# Patient Record
Sex: FEMALE | Race: ASIAN | NOT HISPANIC OR LATINO | Employment: FULL TIME
[De-identification: names, ages, dates, MRNs, and addresses within clinical notes are randomized per-mention and may not be internally consistent; named-entity substitution may affect disease eponyms.]

---

## 2024-05-19 ENCOUNTER — HEALTH MAINTENANCE LETTER (OUTPATIENT)
Age: 34
End: 2024-05-19

## 2024-06-03 ENCOUNTER — OFFICE VISIT (OUTPATIENT)
Dept: FAMILY MEDICINE | Facility: CLINIC | Age: 34
End: 2024-06-03
Payer: COMMERCIAL

## 2024-06-03 VITALS
TEMPERATURE: 97.8 F | WEIGHT: 163 LBS | DIASTOLIC BLOOD PRESSURE: 60 MMHG | BODY MASS INDEX: 27.83 KG/M2 | RESPIRATION RATE: 16 BRPM | HEIGHT: 64 IN | HEART RATE: 89 BPM | SYSTOLIC BLOOD PRESSURE: 107 MMHG | OXYGEN SATURATION: 98 %

## 2024-06-03 DIAGNOSIS — R22.9 LUMP OF SKIN: Primary | ICD-10-CM

## 2024-06-03 DIAGNOSIS — Z86.39 HISTORY OF IRON DEFICIENCY: ICD-10-CM

## 2024-06-03 DIAGNOSIS — Z30.09 FAMILY PLANNING: ICD-10-CM

## 2024-06-03 LAB
ERYTHROCYTE [DISTWIDTH] IN BLOOD BY AUTOMATED COUNT: 12.9 % (ref 10–15)
HCT VFR BLD AUTO: 41.5 % (ref 35–47)
HGB BLD-MCNC: 13.5 G/DL (ref 11.7–15.7)
HOLD SPECIMEN: NORMAL
MCH RBC QN AUTO: 25.5 PG (ref 26.5–33)
MCHC RBC AUTO-ENTMCNC: 32.5 G/DL (ref 31.5–36.5)
MCV RBC AUTO: 78 FL (ref 78–100)
PLATELET # BLD AUTO: 230 10E3/UL (ref 150–450)
RBC # BLD AUTO: 5.3 10E6/UL (ref 3.8–5.2)
WBC # BLD AUTO: 8.6 10E3/UL (ref 4–11)

## 2024-06-03 PROCEDURE — G2211 COMPLEX E/M VISIT ADD ON: HCPCS

## 2024-06-03 PROCEDURE — 84443 ASSAY THYROID STIM HORMONE: CPT

## 2024-06-03 PROCEDURE — 36415 COLL VENOUS BLD VENIPUNCTURE: CPT

## 2024-06-03 PROCEDURE — 85027 COMPLETE CBC AUTOMATED: CPT

## 2024-06-03 PROCEDURE — 99203 OFFICE O/P NEW LOW 30 MIN: CPT

## 2024-06-03 ASSESSMENT — PAIN SCALES - GENERAL: PAINLEVEL: NO PAIN (0)

## 2024-06-03 NOTE — PROGRESS NOTES
Assessment & Plan     Lump of skin  Right posterior thigh small soft mobile mass, appears to be a lipoma. Right forehead small slightly firm mobile mass that she reports changes in size but never disappears. Would like to discuss removal. Derm referral placed today.   - Adult Dermatology  Referral    History of iron deficiency  Reports low hemoglobin during pregnancy and post partum. Not currently on any iron supplements. Will check labs today and LISETTE signed to get her records from [place].   - CBC with Platelets and Reflex to Iron Studies  - TSH with free T4 reflex    Family planning  Has 8 month old at home and wants to start planning for second child. Would like to establish with obgyn/CNM.  - Ob/Gyn  Referral    Plan to follow up for routine physical.     Subjective   Umu is a 33 year old, presenting for the following health issues:  office vist, Derm Problem, and Recheck Medication (Pt reports that she is here to discuss having a small bump on rt side of forehead, rt leg above knee.)      6/3/2024    11:18 AM   Additional Questions   Roomed by marcial   Accompanied by          6/3/2024    11:18 AM   Patient Reported Additional Medications   Patient reports taking the following new medications none     History of Present Illness       Reason for visit:  Derm concern    She eats 2-3 servings of fruits and vegetables daily.She consumes 0 sweetened beverage(s) daily.She exercises with enough effort to increase her heart rate 9 or less minutes per day.  She exercises with enough effort to increase her heart rate 3 or less days per week.   She is taking medications regularly.       Son born August 2023   2 weeks after delivery noticed a lump on her forehead and right posterior thigh. No change in thigh but the lump on her forehead sometimes will change in size but never fully go away. Sometimes will feel more firm. No redness or pain. Denies any drainage.    Accompanied by her   "today and they recently moved from [place] originally from [place]. Would like to establish with obgyn to discuss family planning for their next child.       Review of Systems  Constitutional, HEENT, cardiovascular, pulmonary, gi and gu systems are negative, except as otherwise noted.      Objective    /60 (BP Location: Left arm, Patient Position: Sitting, Cuff Size: Adult Regular)   Pulse 89   Temp 97.8  F (36.6  C) (Tympanic)   Resp 16   Ht 1.626 m (5' 4\")   Wt 73.9 kg (163 lb)   LMP 05/26/2024 (Approximate)   SpO2 98%   Breastfeeding No   BMI 27.98 kg/m    Body mass index is 27.98 kg/m .    Physical Exam   GENERAL: alert and no distress  NECK: no adenopathy, no asymmetry, masses, or scars  SKIN: soft small mobile mass right posterior thigh, no erythema or edema.  Right forehead with small slightly firm mobile mass. No erythema or edema. No drainage.   PSYCH: mentation appears normal, affect normal/bright          Signed Electronically by: ALEXANDRA Perez CNP    "

## 2024-06-04 LAB — TSH SERPL DL<=0.005 MIU/L-ACNC: 0.85 UIU/ML (ref 0.3–4.2)

## 2024-10-09 ENCOUNTER — APPOINTMENT (OUTPATIENT)
Dept: LAB | Facility: CLINIC | Age: 34
End: 2024-10-09
Attending: ADVANCED PRACTICE MIDWIFE
Payer: COMMERCIAL

## 2024-10-09 ENCOUNTER — OFFICE VISIT (OUTPATIENT)
Dept: OBGYN | Facility: CLINIC | Age: 34
End: 2024-10-09
Attending: ADVANCED PRACTICE MIDWIFE
Payer: COMMERCIAL

## 2024-10-09 VITALS
DIASTOLIC BLOOD PRESSURE: 81 MMHG | SYSTOLIC BLOOD PRESSURE: 138 MMHG | HEART RATE: 98 BPM | BODY MASS INDEX: 26.95 KG/M2 | WEIGHT: 157 LBS

## 2024-10-09 DIAGNOSIS — N92.1 MENOMETRORRHAGIA: ICD-10-CM

## 2024-10-09 DIAGNOSIS — N39.3 STRESS INCONTINENCE OF URINE: ICD-10-CM

## 2024-10-09 DIAGNOSIS — N93.9 ABNORMAL UTERINE BLEEDING: Primary | ICD-10-CM

## 2024-10-09 LAB
ERYTHROCYTE [DISTWIDTH] IN BLOOD BY AUTOMATED COUNT: 13.2 % (ref 10–15)
HCG INTACT+B SERPL-ACNC: <1 MIU/ML
HCG UR QL: NEGATIVE
HCG UR QL: NORMAL
HCT VFR BLD AUTO: 41.6 % (ref 35–47)
HGB BLD-MCNC: 14 G/DL (ref 11.7–15.7)
INTERNAL QC OK POCT: NORMAL
INTERNAL QC OK POCT: NORMAL
MCH RBC QN AUTO: 26.6 PG (ref 26.5–33)
MCHC RBC AUTO-ENTMCNC: 33.7 G/DL (ref 31.5–36.5)
MCV RBC AUTO: 79 FL (ref 78–100)
PLATELET # BLD AUTO: 265 10E3/UL (ref 150–450)
POCT KIT EXPIRATION DATE: NORMAL
POCT KIT EXPIRATION DATE: NORMAL
POCT KIT LOT NUMBER: NORMAL
POCT KIT LOT NUMBER: NORMAL
RBC # BLD AUTO: 5.27 10E6/UL (ref 3.8–5.2)
TSH SERPL DL<=0.005 MIU/L-ACNC: 0.89 UIU/ML (ref 0.3–4.2)
WBC # BLD AUTO: 9.1 10E3/UL (ref 4–11)

## 2024-10-09 PROCEDURE — 81025 URINE PREGNANCY TEST: CPT | Performed by: ADVANCED PRACTICE MIDWIFE

## 2024-10-09 PROCEDURE — 99203 OFFICE O/P NEW LOW 30 MIN: CPT | Performed by: ADVANCED PRACTICE MIDWIFE

## 2024-10-09 PROCEDURE — 36415 COLL VENOUS BLD VENIPUNCTURE: CPT | Performed by: ADVANCED PRACTICE MIDWIFE

## 2024-10-09 PROCEDURE — 84146 ASSAY OF PROLACTIN: CPT | Performed by: ADVANCED PRACTICE MIDWIFE

## 2024-10-09 PROCEDURE — 99213 OFFICE O/P EST LOW 20 MIN: CPT | Performed by: ADVANCED PRACTICE MIDWIFE

## 2024-10-09 PROCEDURE — 85014 HEMATOCRIT: CPT | Performed by: ADVANCED PRACTICE MIDWIFE

## 2024-10-09 PROCEDURE — 84443 ASSAY THYROID STIM HORMONE: CPT | Performed by: ADVANCED PRACTICE MIDWIFE

## 2024-10-09 PROCEDURE — 84702 CHORIONIC GONADOTROPIN TEST: CPT | Performed by: ADVANCED PRACTICE MIDWIFE

## 2024-10-09 ASSESSMENT — PAIN SCALES - GENERAL: PAINLEVEL: MILD PAIN (2)

## 2024-10-09 NOTE — LETTER
10/9/2024       RE: Umu Krishnamurthy  [address]  [place] [address]     Dear Colleague,    Thank you for referring your patient, Umu Krishnamurthy, to the [facility] [place] at [facility]. Please see a copy of my visit note below.    SUBJECTIVE:  Concern: Has had frequent, heavy vaginal bleeding the last three months. Episodes usually lasting 15 days at a time, last episode started September 23rd. Gave birth 18 months ago. In the past has had regular, heavy periods lasting 6-7 days with intermittent episodes of these extended bleeding periods. Past diagnosis of fibroids. Has taken BC during these heavy bleeding episodes with good success. Wanting to ensure that this new bleeding is due to the fibroids and nothing new. Had a normal pap smear completed this February in [place]. Denies history of postpartum hemorrhage, frequent nose bleeds, or prolonged bleeding with injury. Has not had sex with partner for the last two months so does not believe she could be pregnant. Would like to get pregnant again in 1-2 years but amenable to taking BC again to control bleeding in the interim.    Past Medical History:   Diagnosis Date     Fibroid uterus      History reviewed. No pertinent surgical history.    I have reviewed this patient's family history and updated it with pertinent information if needed.  Family History   Problem Relation Age of Onset     Stomach Cancer Mother      Liver Cancer Maternal Grandmother        No current outpatient medications on file.      OBJECTIVE:    /81   Pulse 98   Wt 71.2 kg (157 lb)   LMP 09/23/2024 (Exact Date)   BMI 26.95 kg/m      Results for orders placed or performed in visit on 10/09/24   HCG qualitative urine POCT, Enter/Edit Result     Status: Normal   Result Value Ref Range    HCG Qual Urine Unknown Negative    Internal QC Check POCT Valid Valid    POCT Kit Lot Number [id number]     POCT Kit  Expiration Date 3-21-26    hCG qual urine POCT     Status: None   Result Value Ref Range    HCG Qual Urine Negative Negative    Internal QC Check POCT Valid Valid    POCT Kit Lot Number [id number]     POCT Kit Expiration Date 3-21-26        Pelvic Exam:  Vulva: No external lesions, normal hair distribution, no adenopathy  Vagina: Moist, pink, bloody discharge, well rugated, no lesions  Cervix: Multiparous, smooth, pink, no visible lesions    ASSESSMENT/PLAN:    1. Abnormal uterine bleeding  Schedule follow up visit after results of TVUS to discuss options. Beta hCG ordered due to one indeterminate urine HCG in clinic and follow-up negative urine HCG in clinic. Deferring BC initiation until lab results.  - HCG qualitative urine POCT, Enter/Edit Result  - hCG qual urine POCT  - TSH with free T4 reflex  - CBC with platelets  - hCG Quantitative Pregnancy  - Prolactin  - US Pelvic Transabdominal and Transvaginal; Future    2. Menometrorrhagia  Same as above    3. Stress urinary incontinence  Pelvic floor PT referral    I, Zeinab Carroll , am serving as a scribe; to document services personally performed by  Theresa Haider CNM based on data collection and the provider's statements to me.     Patient was seen with student who acted as my scribe and was present for learning. I personally assessed, examined and made medical decisions reflected in the documentation. Any necessary edits to the note have been made by myself. Theresa Haider CNM          Again, thank you for allowing me to participate in the care of your patient.      Sincerely,    Theresa Haider CNM

## 2024-10-09 NOTE — PROGRESS NOTES
SUBJECTIVE:  Concern: Has had frequent, heavy vaginal bleeding the last three months. Episodes usually lasting 15 days at a time, last episode started September 23rd. Gave birth 18 months ago. In the past has had regular, heavy periods lasting 6-7 days with intermittent episodes of these extended bleeding periods. Past diagnosis of fibroids. Has taken BC during these heavy bleeding episodes with good success. Wanting to ensure that this new bleeding is due to the fibroids and nothing new. Had a normal pap smear completed this February in [place]. Denies history of postpartum hemorrhage, frequent nose bleeds, or prolonged bleeding with injury. Has not had sex with partner for the last two months so does not believe she could be pregnant. Would like to get pregnant again in 1-2 years but amenable to taking BC again to control bleeding in the interim.    Past Medical History:   Diagnosis Date    Fibroid uterus      History reviewed. No pertinent surgical history.    I have reviewed this patient's family history and updated it with pertinent information if needed.  Family History   Problem Relation Age of Onset    Stomach Cancer Mother     Liver Cancer Maternal Grandmother        No current outpatient medications on file.      OBJECTIVE:    /81   Pulse 98   Wt 71.2 kg (157 lb)   LMP 09/23/2024 (Exact Date)   BMI 26.95 kg/m      Results for orders placed or performed in visit on 10/09/24   HCG qualitative urine POCT, Enter/Edit Result     Status: Normal   Result Value Ref Range    HCG Qual Urine Unknown Negative    Internal QC Check POCT Valid Valid    POCT Kit Lot Number [id number]     POCT Kit Expiration Date 3-21-26    hCG qual urine POCT     Status: None   Result Value Ref Range    HCG Qual Urine Negative Negative    Internal QC Check POCT Valid Valid    POCT Kit Lot Number [id number]     POCT Kit Expiration Date 3-21-26        Pelvic Exam:  Vulva: No external lesions, normal hair distribution, no  adenopathy  Vagina: Moist, pink, bloody discharge, well rugated, no lesions  Cervix: Multiparous, smooth, pink, no visible lesions    ASSESSMENT/PLAN:    1. Abnormal uterine bleeding  Schedule follow up visit after results of TVUS to discuss options. Beta hCG ordered due to one indeterminate urine HCG in clinic and follow-up negative urine HCG in clinic. Deferring BC initiation until lab results.  - HCG qualitative urine POCT, Enter/Edit Result  - hCG qual urine POCT  - TSH with free T4 reflex  - CBC with platelets  - hCG Quantitative Pregnancy  - Prolactin  - US Pelvic Transabdominal and Transvaginal; Future    2. Menometrorrhagia  Same as above    3. Stress urinary incontinence  Pelvic floor PT referral    I, Zeinab Carroll , am serving as a scribe; to document services personally performed by  Theresa Haider CNM based on data collection and the provider's statements to me.     Patient was seen with student who acted as my scribe and was present for learning. I personally assessed, examined and made medical decisions reflected in the documentation. Any necessary edits to the note have been made by myself. Theresa Haider CNM

## 2024-10-10 LAB — PROLACTIN SERPL 3RD IS-MCNC: 6 NG/ML (ref 5–23)

## 2024-10-24 ENCOUNTER — OFFICE VISIT (OUTPATIENT)
Dept: OBGYN | Facility: CLINIC | Age: 34
End: 2024-10-24
Attending: ADVANCED PRACTICE MIDWIFE
Payer: COMMERCIAL

## 2024-10-24 ENCOUNTER — ANCILLARY PROCEDURE (OUTPATIENT)
Dept: ULTRASOUND IMAGING | Facility: CLINIC | Age: 34
End: 2024-10-24
Attending: ADVANCED PRACTICE MIDWIFE
Payer: COMMERCIAL

## 2024-10-24 VITALS
DIASTOLIC BLOOD PRESSURE: 85 MMHG | SYSTOLIC BLOOD PRESSURE: 125 MMHG | BODY MASS INDEX: 27.47 KG/M2 | HEIGHT: 64 IN | HEART RATE: 80 BPM | WEIGHT: 160.9 LBS

## 2024-10-24 DIAGNOSIS — N92.1 MENOMETRORRHAGIA: Primary | ICD-10-CM

## 2024-10-24 DIAGNOSIS — N92.1 MENOMETRORRHAGIA: ICD-10-CM

## 2024-10-24 PROCEDURE — 99213 OFFICE O/P EST LOW 20 MIN: CPT | Mod: 25 | Performed by: ADVANCED PRACTICE MIDWIFE

## 2024-10-24 PROCEDURE — 76830 TRANSVAGINAL US NON-OB: CPT | Mod: 26 | Performed by: OBSTETRICS & GYNECOLOGY

## 2024-10-24 PROCEDURE — 76856 US EXAM PELVIC COMPLETE: CPT | Mod: 26 | Performed by: OBSTETRICS & GYNECOLOGY

## 2024-10-24 PROCEDURE — 99213 OFFICE O/P EST LOW 20 MIN: CPT | Performed by: ADVANCED PRACTICE MIDWIFE

## 2024-10-24 PROCEDURE — 76856 US EXAM PELVIC COMPLETE: CPT

## 2024-10-24 NOTE — LETTER
"10/24/2024       RE: Umu Krishnamurthy   [address]  [place] [address]     Dear Colleague,    Thank you for referring your patient, Umu Krishnamurthy, to the [facility] [place] at [facility]. Please see a copy of my visit note below.    Gyn Clinic Visit Note  10/24/2024    S: Umu Krishnamurthy is a 34 year old  here today for US follow-up. Mucus and blood spotting every morning with BM since 10/10. LMP 10/21. No cramping. Depressed past 2 days. Declines therapist referral at this time. Hx mood changes with periods in past. She and  have concerns about extensive family hx of cancer- specifically stomach, liver and breast. Youngest family member with breast cancer was dx'd age 49. They wonder if she needs eval due to her abnormal bleeding and if it could be a sign of cancer.     Gyn Hx:   Patient's last menstrual period was 10/21/2024 (exact date).  Menses:   See previous note, menometrorrhagia  Contraception:   Trying to conceive  Sexual Activity    Sexually transmitted disease history:  none  PAP smear history:  Negative in 2024 in [place]    Reviewed unofficial US results - heterogenous endometrium 10.6mm, possible polyp 0.7 x 0.9 x 0.7cm, R ovarian simple cyst 3.7 x 3.3 x 2.8 cm, L ovary WNL, R anterior intramural fibroid 1.1 x 0.9 x 0.7cm    Labs completed since last visit- Prolactin, TSH, CBC and HCG all negative/WNL    O:   /85   Pulse 80   Ht 1.626 m (5' 4\")   Wt 73 kg (160 lb 14.4 oz)   LMP 10/21/2024 (Exact Date)   BMI 27.62 kg/m    Pelvic exam: Deferred    A:  Umu Krishnamurthy is a 34 year old y/o  here today for US follow-up.     P: Follow-up with MD to discuss US results showing possible polyp. Reviewed they will determine if EMB is needed based on hx and results. Recommended to initiate mammograms at 40 due to fam hx breast cancer at 49. Pt has questions re: lump on forehead- " advised establish with PCP- she will schedule with Dr. Giraldo. RTC sooner prn for problems.     Theresa MORRIS CNM           Again, thank you for allowing me to participate in the care of your patient.      Sincerely,    Theresa Haider CNM

## 2024-10-24 NOTE — PROGRESS NOTES
"Gyn Clinic Visit Note  10/24/2024    S: Umu Krishnamurthy is a 34 year old  here today for US follow-up. Mucus and blood spotting every morning with BM since 10/10. LMP 10/21. No cramping. Depressed past 2 days. Declines therapist referral at this time. Hx mood changes with periods in past. She and  have concerns about extensive family hx of cancer- specifically stomach, liver and breast. Youngest family member with breast cancer was dx'd age 49. They wonder if she needs eval due to her abnormal bleeding and if it could be a sign of cancer.     Gyn Hx:   Patient's last menstrual period was 10/21/2024 (exact date).  Menses:   See previous note, menometrorrhagia  Contraception:   Trying to conceive  Sexual Activity    Sexually transmitted disease history:  none  PAP smear history:  Negative in 2024 in [place]    Reviewed unofficial US results - heterogenous endometrium 10.6mm, possible polyp 0.7 x 0.9 x 0.7cm, R ovarian simple cyst 3.7 x 3.3 x 2.8 cm, L ovary WNL, R anterior intramural fibroid 1.1 x 0.9 x 0.7cm    Labs completed since last visit- Prolactin, TSH, CBC and HCG all negative/WNL    O:   /85   Pulse 80   Ht 1.626 m (5' 4\")   Wt 73 kg (160 lb 14.4 oz)   LMP 10/21/2024 (Exact Date)   BMI 27.62 kg/m    Pelvic exam: Deferred    A:  Umu Krishnamurthy is a 34 year old y/o  here today for US follow-up.     P: Follow-up with MD to discuss US results showing possible polyp. Reviewed they will determine if EMB is needed based on hx and results. Recommended to initiate mammograms at 40 due to fam hx breast cancer at 49. Pt has questions re: lump on forehead- advised establish with PCP- she will schedule with Dr. Giraldo. RTC sooner prn for problems.     Theresa MORRIS, CNM       "

## 2024-10-30 ENCOUNTER — PREP FOR PROCEDURE (OUTPATIENT)
Dept: OBGYN | Facility: CLINIC | Age: 34
End: 2024-10-30

## 2024-10-30 ENCOUNTER — OFFICE VISIT (OUTPATIENT)
Dept: OBGYN | Facility: CLINIC | Age: 34
End: 2024-10-30
Payer: COMMERCIAL

## 2024-10-30 VITALS
HEART RATE: 76 BPM | DIASTOLIC BLOOD PRESSURE: 78 MMHG | BODY MASS INDEX: 27.29 KG/M2 | WEIGHT: 159 LBS | SYSTOLIC BLOOD PRESSURE: 118 MMHG

## 2024-10-30 DIAGNOSIS — N84.0 ENDOMETRIAL POLYP: Primary | ICD-10-CM

## 2024-10-30 DIAGNOSIS — N93.9 ABNORMAL UTERINE BLEEDING (AUB): Primary | ICD-10-CM

## 2024-10-30 PROCEDURE — 99213 OFFICE O/P EST LOW 20 MIN: CPT | Mod: GE

## 2024-10-30 PROCEDURE — 99213 OFFICE O/P EST LOW 20 MIN: CPT

## 2024-10-30 RX ORDER — ACETAMINOPHEN 325 MG/1
975 TABLET ORAL ONCE
OUTPATIENT
Start: 2024-10-30 | End: 2024-10-30

## 2024-10-30 RX ORDER — NORGESTIMATE AND ETHINYL ESTRADIOL 0.25-0.035
1 KIT ORAL DAILY
Qty: 90 TABLET | Refills: 3 | Status: SHIPPED | OUTPATIENT
Start: 2024-10-30

## 2024-10-30 NOTE — LETTER
10/30/2024       RE: Umu Krishnamurthy   [address]  [place] [address]     Dear Colleague,    Thank you for referring your patient, Umu Krishnamurthy, to the [facility] [place] at [facility]. Please see a copy of my visit note below.    SUBJECTIVE   Umu Krishnamurthy is a 34 year old , Patient's last menstrual period was 10/23/2024 (exact date)., referred for consultation for abnormal uterine bleeding in the setting of endometrial polyp.  Patient was seen by midwife service for abnormal uterine bleeding and underwent ultrasound imaging.  Ultrasound showed myomatous uterus with 1 x 1 x 1 cm endometrial polyp.    Patient describes abnormal bleeding as heavy with 4-5 pads a day and small clots.  Patient is concerned about amount of bleeding.  Denies shortness of breath, denies chest pain, denies dizziness or syncope.    Specific concerns today:    AUB in the setting of endometrial polyp      Gynecologic History  Patient's last menstrual period was 10/23/2024 (exact date).   Menstrual History:      10/9/2024     3:00 PM 10/24/2024     9:40 AM 10/30/2024     1:15 PM   Menstrual History   LAST MENSTRUAL PERIOD 2024 10/21/2024 10/23/2024       Current contraception: none  Number of partners in last year: 1    Obstetric History  OB History    Para Term  AB Living   1 1 1 0 0 10   SAB IAB Ectopic Multiple Live Births   0 0 0 0 1      # Outcome Date GA Lbr Dev/2nd Weight Sex Type Anes PTL Lv   1 Term 08/15/23 37w0d   M  EPI  LIVE BIRTH        Past Medical History  Past Medical History:   Diagnosis Date     Fibroid uterus        Past Surgical History  No past surgical history on file.    Medications  Current Outpatient Medications   Medication Sig Dispense Refill     norgestimate-ethinyl estradiol (ORTHO-CYCLEN) 0.25-35 MG-MCG tablet Take 1 tablet by mouth daily. 90 tablet 3     No current facility-administered  medications for this visit.       Allergies   No Known Allergies    Social History  Social History     Tobacco Use     Smoking status: Never     Smokeless tobacco: Never   Vaping Use     Vaping status: Never Used   Substance Use Topics     Alcohol use: Never     Drug use: Never       Family History  Family History   Problem Relation Age of Onset     Stomach Cancer Mother      Liver Cancer Maternal Grandmother        OBJECTIVE   /78   Pulse 76   Wt 72.1 kg (159 lb)   LMP 10/23/2024 (Exact Date)   BMI 27.29 kg/m    BMI: Body mass index is 27.29 kg/m .  General:  Alert, no distress   Head:  Normocephalic, without obvious abnormality   Lungs:  Clear to auscultation bilaterally   Heart:  Regular rate and rhythm, no murmur   Abdomen:  Soft, non-tender, non-distended, bowel sounds normal   Extremities:  normal          ASSESSMENT   Umu Raghu is a 34 year old referred for consultation for abnormal uterine bleeding in the setting of endometrial polyp.  Discussed recommendation for hysteroscopy, dilation curettage with morcellation.  We discussed risks and benefits of minimally invasive procedure to remove polyp and any other abnormal tissue.  We discussed very low suspicion for precancerous or cancerous etiology however cannot rule out without sending for pathology.  Anticipate bleeding will improve with removal of endometrial polyp.  Patient additionally hoping for future pregnancy, we discussed the additional recommendation for removal of polyp in the setting of intending conception.  Patient agreeable to plan and will schedule surgical management.     PLAN     #AUB  #Endometrial polyp  -Plan for hysteroscopy, dilation curettage, morcellation of endometrial polyp  -Preoperative orders placed  -Sprintec prescription sent, recommended daily use until procedure for bleeding control  -Given strict return precautions for shortness of breath, lightheadedness, bleeding greater than 1 pad an hour  -This visit will  serve as preoperative history and physical, patient healthy and tolerated anesthesia in the past    Sheldon Jean-Baptiste DO, MA  [facility]- PGY3  1:59 PM October 30, 2024     The Patient was seen in [facility] by SHELDON JEAN-BAPTISTE.  I reviewed the history & exam. Assessment and plan were jointly made.    Paulina Johnson MD          Chief Complaint   Patient presents with     Follow Up     ULS results    Daniela Lazaro LPN       Again, thank you for allowing me to participate in the care of your patient.      Sincerely,    Sheldon Jean-Baptiste MD

## 2024-10-30 NOTE — PATIENT INSTRUCTIONS
Thank you for trusting us with your care!   Please be aware, if you are on Mychart, you may see your results prior to your providers review. If labs are abnormal, we will call or message you on TYMRt with a follow up plan.    If you need to contact us for questions about:  Symptoms, Scheduling & Medical Questions; Non-urgent (2-3 day response) [employer] message, Urgent (needing response today) [phone] (if after 3:30pm next day response)   Prescriptions: Please call your Pharmacy   Billing: Christina [phone] or [facility]:[phone]

## 2024-11-29 ENCOUNTER — HOSPITAL ENCOUNTER (OUTPATIENT)
Facility: CLINIC | Age: 34
Discharge: HOME | End: 2024-11-29
Attending: STUDENT IN AN ORGANIZED HEALTH CARE EDUCATION/TRAINING PROGRAM | Admitting: STUDENT IN AN ORGANIZED HEALTH CARE EDUCATION/TRAINING PROGRAM
Payer: COMMERCIAL

## 2024-11-29 VITALS
SYSTOLIC BLOOD PRESSURE: 105 MMHG | HEART RATE: 82 BPM | TEMPERATURE: 97.7 F | DIASTOLIC BLOOD PRESSURE: 68 MMHG | OXYGEN SATURATION: 99 % | RESPIRATION RATE: 16 BRPM | WEIGHT: 155.87 LBS | BODY MASS INDEX: 26.61 KG/M2 | HEIGHT: 64 IN

## 2024-11-29 DIAGNOSIS — N84.0 ENDOMETRIAL POLYP: ICD-10-CM

## 2024-11-29 DIAGNOSIS — M54.50 LUMBAR BACK PAIN: Primary | ICD-10-CM

## 2024-11-29 LAB — HCG UR QL: NEGATIVE

## 2024-11-29 PROCEDURE — 88305 TISSUE EXAM BY PATHOLOGIST: CPT | Mod: TC | Performed by: STUDENT IN AN ORGANIZED HEALTH CARE EDUCATION/TRAINING PROGRAM

## 2024-11-29 PROCEDURE — 272N000001 HC OR GENERAL SUPPLY STERILE: Performed by: STUDENT IN AN ORGANIZED HEALTH CARE EDUCATION/TRAINING PROGRAM

## 2024-11-29 PROCEDURE — 250N000013 HC RX MED GY IP 250 OP 250 PS 637: Performed by: ANESTHESIOLOGY

## 2024-11-29 PROCEDURE — 88305 TISSUE EXAM BY PATHOLOGIST: CPT | Mod: 26 | Performed by: PATHOLOGY

## 2024-11-29 PROCEDURE — 81025 URINE PREGNANCY TEST: CPT | Performed by: OBSTETRICS & GYNECOLOGY

## 2024-11-29 PROCEDURE — 710N000012 HC RECOVERY PHASE 2, PER MINUTE: Performed by: STUDENT IN AN ORGANIZED HEALTH CARE EDUCATION/TRAINING PROGRAM

## 2024-11-29 PROCEDURE — 250N000009 HC RX 250: Performed by: STUDENT IN AN ORGANIZED HEALTH CARE EDUCATION/TRAINING PROGRAM

## 2024-11-29 PROCEDURE — 250N000013 HC RX MED GY IP 250 OP 250 PS 637: Performed by: OBSTETRICS & GYNECOLOGY

## 2024-11-29 PROCEDURE — 58558 HYSTEROSCOPY BIOPSY: CPT | Mod: GC | Performed by: STUDENT IN AN ORGANIZED HEALTH CARE EDUCATION/TRAINING PROGRAM

## 2024-11-29 PROCEDURE — 360N000076 HC SURGERY LEVEL 3, PER MIN: Performed by: STUDENT IN AN ORGANIZED HEALTH CARE EDUCATION/TRAINING PROGRAM

## 2024-11-29 PROCEDURE — 999N000141 HC STATISTIC PRE-PROCEDURE NURSING ASSESSMENT: Performed by: STUDENT IN AN ORGANIZED HEALTH CARE EDUCATION/TRAINING PROGRAM

## 2024-11-29 PROCEDURE — 370N000017 HC ANESTHESIA TECHNICAL FEE, PER MIN: Performed by: STUDENT IN AN ORGANIZED HEALTH CARE EDUCATION/TRAINING PROGRAM

## 2024-11-29 RX ORDER — ONDANSETRON 4 MG/1
4 TABLET, ORALLY DISINTEGRATING ORAL EVERY 30 MIN PRN
Status: DISCONTINUED | OUTPATIENT
Start: 2024-11-29 | End: 2024-11-29 | Stop reason: HOSPADM

## 2024-11-29 RX ORDER — ACETAMINOPHEN 325 MG/1
975 TABLET ORAL ONCE
Status: COMPLETED | OUTPATIENT
Start: 2024-11-29 | End: 2024-11-29

## 2024-11-29 RX ORDER — ACETAMINOPHEN 325 MG/1
975 TABLET ORAL ONCE
Status: DISCONTINUED | OUTPATIENT
Start: 2024-11-29 | End: 2024-11-29 | Stop reason: HOSPADM

## 2024-11-29 RX ORDER — DEXAMETHASONE SODIUM PHOSPHATE 4 MG/ML
4 INJECTION, SOLUTION INTRA-ARTICULAR; INTRALESIONAL; INTRAMUSCULAR; INTRAVENOUS; SOFT TISSUE
Status: DISCONTINUED | OUTPATIENT
Start: 2024-11-29 | End: 2024-11-29 | Stop reason: HOSPADM

## 2024-11-29 RX ORDER — NALOXONE HYDROCHLORIDE 0.4 MG/ML
0.1 INJECTION, SOLUTION INTRAMUSCULAR; INTRAVENOUS; SUBCUTANEOUS
Status: DISCONTINUED | OUTPATIENT
Start: 2024-11-29 | End: 2024-11-29 | Stop reason: HOSPADM

## 2024-11-29 RX ORDER — OXYCODONE HYDROCHLORIDE 10 MG/1
10 TABLET ORAL
Status: DISCONTINUED | OUTPATIENT
Start: 2024-11-29 | End: 2024-11-29 | Stop reason: HOSPADM

## 2024-11-29 RX ORDER — ONDANSETRON 2 MG/ML
4 INJECTION INTRAMUSCULAR; INTRAVENOUS EVERY 30 MIN PRN
Status: DISCONTINUED | OUTPATIENT
Start: 2024-11-29 | End: 2024-11-29 | Stop reason: HOSPADM

## 2024-11-29 RX ORDER — FENTANYL CITRATE 50 UG/ML
25 INJECTION, SOLUTION INTRAMUSCULAR; INTRAVENOUS
Status: DISCONTINUED | OUTPATIENT
Start: 2024-11-29 | End: 2024-11-29 | Stop reason: HOSPADM

## 2024-11-29 RX ORDER — OXYCODONE HYDROCHLORIDE 5 MG/1
5 TABLET ORAL
Status: COMPLETED | OUTPATIENT
Start: 2024-11-29 | End: 2024-11-29

## 2024-11-29 RX ADMIN — OXYCODONE HYDROCHLORIDE 5 MG: 5 TABLET ORAL at 11:43

## 2024-11-29 RX ADMIN — ACETAMINOPHEN 975 MG: 325 TABLET, FILM COATED ORAL at 09:08

## 2024-11-29 ASSESSMENT — ACTIVITIES OF DAILY LIVING (ADL)
ADLS_ACUITY_SCORE: 32

## 2024-11-29 NOTE — DISCHARGE INSTRUCTIONS
To contact a doctor, call  Becky Tijerina MD   Gynecology Office: [phone] or:     [phone] and ask for the Resident On Call for:          Gynecology (answered 24 hours a day)   Emergency Departments:  [facility] Adult Emergency Department: [phone]     [facility] Children's Emergency Department: [phone]

## 2024-11-29 NOTE — OP NOTE
[facility]   Operative Note    Date of service: 2024    Preoperative diagnosis:  AUB, endometrial polyp  Postoperative diagnosis:  Same    Procedure:  EUA, hysteroscopy with Myosure, dilation and curettage    Surgeon:  Becky Tijerina MD    Assistant: Angeles Mann MD PGY-1    Anesthesia:  MAC  EBL:  15 mL  IVF:  500 mL  UOP:  Voided prior to OR  Fluid deficit:  570 mL    Specimens: Endometrial/endocervical polyp    Complications: None    Findings:    Exam under anesthesia revealed normal cervix, retroverted uterus, no adnexal masses.   Speculum exam revealed multiparous cervix with no lesions.   Hysteroscopy revealed fluffy endometrial tissue - possible polyp in left lower quadrant of endometrium and small endocervical polyp polyps, otherwise normal uterine cavity, and normal tubal ostia visualized bilaterally.       Indications:  Umu Krishnamurthy is a 34 year old  who presented to clinic with AUB in the setting of a polyp. The above state procedure was recommended.  Risks, benefits, and alternatives to the procedure were discussed with the patient who elected to proceed.  All questions were answered and an informed consent was obtained.    Procedure:  The patient was taken to the operating room where she underwent MAC anesthesia without difficulty.  She was placed in a dorsal lithotomy position using yellow-fin stirrups.  The patient was examined for the above noted findings and then prepped and draped in the usual sterile fashion.  A medium graves speculum was inserted into the vagina.  A tenaculum was placed on the anterior cervical lip.  A paracervical block was administered with  20 cc 1% plain lidocaine at the 4 and 8 o'clock positions. The endocervical canal was serially dilated to 19 Amharic using Smith dilators. The hysteroscope was inserted without difficulty with the above findings noted.  The Myosure was used to morcellate the polyp and endometrium. The hysteroscope was removed. All  instruments were then removed. The tenaculum was removed from the cervix and the puncture sites noted to be hemostatic with pressure and silver nitrate.  The patient was repositioned to the supine position.  The patient tolerated the procedure well and was taken to the recovery room in stable condition.      Becky Tijerina MD  [facility], Ob/Gyn  11/29/2024 10:12 AM

## 2024-12-02 LAB
PATH REPORT.COMMENTS IMP SPEC: NORMAL
PATH REPORT.COMMENTS IMP SPEC: NORMAL
PATH REPORT.FINAL DX SPEC: NORMAL
PATH REPORT.GROSS SPEC: NORMAL
PATH REPORT.MICROSCOPIC SPEC OTHER STN: NORMAL
PATH REPORT.RELEVANT HX SPEC: NORMAL
PHOTO IMAGE: NORMAL

## 2024-12-26 ENCOUNTER — THERAPY VISIT (OUTPATIENT)
Dept: PHYSICAL THERAPY | Facility: REHABILITATION | Age: 34
End: 2024-12-26
Payer: COMMERCIAL

## 2024-12-26 DIAGNOSIS — M54.50 LUMBAR BACK PAIN: ICD-10-CM

## 2024-12-26 NOTE — PROGRESS NOTES
PHYSICAL THERAPY EVALUATION  Type of Visit: Evaluation       Fall Risk Screen:  Fall screen completed by: PT  Have you fallen 2 or more times in the past year?: (Patient-Rptd) Yes  Have you fallen and had an injury in the past year?: (Patient-Rptd) No  Is patient a fall risk?: No  Fall screen comments: last year when she went to [facility] - slipped on water    Subjective         Presenting condition or subjective complaint:    Pain in LB started 1 month after delivery of her son - 9/23.  R LE pain into lateral hamstring with lifting.  Pain is at lumbosacral junction - same place she got the epidural.    Date of onset: 09/26/23    Relevant medical history:     Dates & types of surgery:  11/29/2024  Dilation and curettage, operative hysteroscopy with morcellator, combined (N/A) Procedure: HYSTEROSCOPY, WITH DILATION AND CURETTAGE OF UTERUS USING MORCELLATOR, EXAM UNDER ANESTHESIA, PELVIS;  Surgeon: Becky Tijerina MD;  Location: [facility] OR    Prior diagnostic imaging/testing results:     NA   Prior therapy history for the same diagnosis, illness or injury: (Patient-Rptd) No   NA     Prior Level of Function  Transfers: Independent  Ambulation: Independent  ADL: Independent  IADL:     Living Environment  Social support: (Patient-Rptd) With family members   Type of home: (Patient-Rptd) Apartment/condo   Stairs to enter the home: (Patient-Rptd) Yes       Ramp: (Patient-Rptd) No   Stairs inside the home: (Patient-Rptd) Yes (Patient-Rptd) 4     Help at home: (Patient-Rptd) None  Equipment owned:       Employment: (Patient-Rptd) Yes   -researcher   Hobbies/Interests:      Patient goals for therapy:      Pain assessment:   Currently 0/10.    At work pain can become severe or very severe.   Worse with lifting at work - lifting is seasonal so she only lifts in the summer 40-45#.   First 2 days of period.    Ok with sleeping, sitting, walking.    Pain with running.    Ibuprofen helps with the pain, hot bath helps with the pain as well.  "  Urine leakage with lifting - has not been lifting the last 4 months at work.    Planning for another child - when she is physically fit they will start trying - maybe 6-8 month     Exercise: nothing over the last 5 years.       Objective   LUMBAR SPINE EVALUATION  PAIN:   INTEGUMENTARY (edema, incisions):   POSTURE:   GAIT:   Weightbearing Status:   Assistive Device(s):   Gait Deviations:   BALANCE/PROPRIOCEPTION:   WEIGHTBEARING ALIGNMENT:   NON-WEIGHTBEARING ALIGNMENT:    ROM:   (Degrees) Left AROM Left PROM  Right AROM Right PROM   Hip Flexion       Hip Extension       Hip Abduction       Hip Adduction       Hip Internal Rotation       Hip External Rotation       Knee Flexion       Knee Extension       Lumbar Side glide     Lumbar Flexion 2\" fingertips to floor - pain in low back    Lumbar Extension 50% limited with pain at lumbosacral junction    Pain:   End feel:   PELVIC/SI SCREEN:   STRENGTH:     MYOTOMES:    Left Right   T12-L3 (Hip Flexion)     L2-4 (Quads)      L4 (Ankle DF)     L5 (Great Toe Ext)     S1 (Toe Raise) 2-3 reps  4-5 reps      DTR S:   CORD SIGNS:   DERMATOMES:   NEURAL TENSION:    Left Right   SLR Positive Positive   SLR with DF     Femoral Nerve     Slump Positive Positive   Dell (Lumbar)     Dell (Thoracic)     Dell (Cervical)     Median     Ulnar     Radial        FLEXIBILITY: Hamstring stretch: limited to 50 degrees B   LUMBAR/HIP Special Tests:    PELVIS/SI SPECIAL TESTS:   FUNCTIONAL TESTS:   PALPATION:  tightness R spinal erectors - lumbar   SPINAL SEGMENTAL CONCLUSIONS:    spring testing L3-4, 4-5 painful.     Assessment & Plan   CLINICAL IMPRESSIONS  Medical Diagnosis: lumbar back pain    Treatment Diagnosis:     Impression/Assessment: Patient is a 34 year old female with midline lumbosacral junction with occasional sciatic symptoms with lifting at work. The following significant findings have been identified: Pain, Decreased ROM/flexibility, and Decreased strength. These " impairments interfere with their ability to perform  holding her son, lifting, standing  as compared to previous level of function.     Clinical Decision Making (Complexity):  Clinical Presentation: Stable/Uncomplicated  Clinical Presentation Rationale: based on medical and personal factors listed in PT evaluation  Clinical Decision Making (Complexity): Low complexity    PLAN OF CARE  Treatment Interventions:  Interventions: Gait Training, Manual Therapy, Neuromuscular Re-education, Therapeutic Activity, Therapeutic Exercise    Long Term Goals     PT Goal 1  Goal Identifier: HEP  Goal Description: Pt will be independent in HEP to manage symptoms  Rationale: to maximize safety and independence within the home  Target Date: 03/20/25  PT Goal 2  Goal Identifier: Holding her son >20 min  Goal Description: Pt will hold her son for >20 min without pain in her low back >2/10  Rationale: to maximize safety and independence with performance of ADLs and functional tasks  Target Date: 03/20/25  PT Goal 3  Goal Identifier: Pt lft 40-45# (crate of potatoes)  Goal Description: Pt will lift 40-45# crate in the clinic without bladder leakage or LBP >2/10 to allow for return to lifting at work next summer  Target Date: 03/20/25  PT Goal 4  Goal Identifier: Standing  Goal Description: 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   Patient/Family/caregiver agrees with Plan of Care.     Evaluation Time:     PT Eval, Low Complexity Minutes (83557): 30       Signing Clinician: Crista Abraham PT

## 2025-01-22 PROBLEM — M54.50 LUMBAR BACK PAIN: Status: RESOLVED | Noted: 2024-12-26 | Resolved: 2025-01-22

## 2025-02-13 ENCOUNTER — NURSE TRIAGE (OUTPATIENT)
Dept: OBGYN | Facility: CLINIC | Age: 35
End: 2025-02-13
Payer: COMMERCIAL

## 2025-02-13 NOTE — TELEPHONE ENCOUNTER
S-(situation): Umu called to report bleeding change after hysteroscopy and new onset fever/chills/diarrhea.    B-(background): . Seen in October for AUB in the setting of an endometrial polyp + myomatous uterus. Hysteroscopy D&C on  for polyp removal (benign.      A-(assessment): Umu has not had a real period after the procedure -- most of the time she just has spotting and feels a pelvic heaviness.    After her minimal bleed this month, she started to develop a fever (no chills, did not remember what her temperature was) starting two days ago + body aches. Noted a small amount of pelvic pain today (minor). No URI symptoms, no urinary symptoms, does not episodic diarrhea/loose stools over the last two days.    Took a negative pregnancy test two days ago.    R-(recommendations): Explained that while her bleeding change may be related to her procedure, I suspect that her fever/body aches/diarrhea are not. [place] currently has quite the array of respiratory/GI illnesses going around. Discussed when she should seek further care at an Urgent Care vs PCP.

## 2025-03-02 NOTE — PROGRESS NOTES
"[facility]  Follow-Up Visit    S: Umu Krishnamurthy is a 34 year old  here for abnormal uterine bleeding.    She has a history of abnormal uterine bleeding and fibroid uterus and has taken birth control pills before. Recently started birth control for AUB and then had a hysteroscopy in 2024 for endometrial polyp removal and stopped birth control at that time.    Right after that, she had a couple of days of bleeding but has not had a period since then, only light spotting. She gained about 10 lbs. Had a negative pregnancy test about one month ago.    Planning on trying to get pregnant after August.       O:  /79   Pulse 86   Ht 1.626 m (5' 4\")   Wt 75.2 kg (165 lb 12.8 oz)   BMI 28.46 kg/m    Gen: Well-appearing, NAD  HEENT: Normocephalic, atraumatic  CV:  Regular rate  Pulm: Normal respiratory effort     A/P:  Umu Krishnamurthy is a 34 year old  here for abnormal uterine bleeding. Has a history of heavy and irregular bleeding in the setting of fibroid uterus and recent polyp s/p removal in 2024. Since then has not been on any hormones and has not had a period.  - Recommended she take one month of birth control pills to both rebuild endometrium after procedure and see if she has progestin withdrawal bleed.   - if she has withdrawal bleed, will stop OCPs and monitor bleeding pattern after that. If no withdrawal bleed, she will need further work-up for secondary amenorrhea (though doesn't yet meet criteria by time, but this pattern is abnormal for her as she previously had regular periods.)    Rosa Hill MD  OBGYN   [facility]  [facility]     "

## 2025-03-03 ENCOUNTER — OFFICE VISIT (OUTPATIENT)
Dept: OBGYN | Facility: CLINIC | Age: 35
End: 2025-03-03
Attending: STUDENT IN AN ORGANIZED HEALTH CARE EDUCATION/TRAINING PROGRAM
Payer: COMMERCIAL

## 2025-03-03 VITALS
WEIGHT: 165.8 LBS | HEART RATE: 86 BPM | BODY MASS INDEX: 28.31 KG/M2 | DIASTOLIC BLOOD PRESSURE: 79 MMHG | SYSTOLIC BLOOD PRESSURE: 129 MMHG | HEIGHT: 64 IN

## 2025-03-03 DIAGNOSIS — N93.9 ABNORMAL UTERINE BLEEDING (AUB): ICD-10-CM

## 2025-03-03 PROCEDURE — 99213 OFFICE O/P EST LOW 20 MIN: CPT | Performed by: STUDENT IN AN ORGANIZED HEALTH CARE EDUCATION/TRAINING PROGRAM

## 2025-03-03 RX ORDER — NORGESTIMATE AND ETHINYL ESTRADIOL 0.25-0.035
1 KIT ORAL DAILY
Qty: 90 TABLET | Refills: 3 | Status: SHIPPED | OUTPATIENT
Start: 2025-03-03

## 2025-03-03 RX ORDER — ONDANSETRON 4 MG/1
4 TABLET, ORALLY DISINTEGRATING ORAL EVERY 8 HOURS PRN
Qty: 60 TABLET | Refills: 1 | Status: SHIPPED | OUTPATIENT
Start: 2025-03-03

## 2025-03-03 NOTE — LETTER
"3/3/2025       RE: Umu Krishnamurthy   \[facility] [place] [facility]  [place] [address]     Dear Colleague,    Thank you for referring your patient, Umu Krishnamurthy, to the [facility] [place] at [facility]. Please see a copy of my visit note below.    [facility]  Follow-Up Visit    S: Umu Krishnamurthy is a 34 year old  here for abnormal uterine bleeding.    She has a history of abnormal uterine bleeding and fibroid uterus and has taken birth control pills before. Recently started birth control for AUB and then had a hysteroscopy in 2024 for endometrial polyp removal and stopped birth control at that time.    Right after that, she had a couple of days of bleeding but has not had a period since then, only light spotting. She gained about 10 lbs. Had a negative pregnancy test about one month ago.    Planning on trying to get pregnant after August.       O:  /79   Pulse 86   Ht 1.626 m (5' 4\")   Wt 75.2 kg (165 lb 12.8 oz)   BMI 28.46 kg/m    Gen: Well-appearing, NAD  HEENT: Normocephalic, atraumatic  CV:  Regular rate  Pulm: Normal respiratory effort     A/P:  Umu Krishnamurthy is a 34 year old  here for abnormal uterine bleeding. Has a history of heavy and irregular bleeding in the setting of fibroid uterus and recent polyp s/p removal in 2024. Since then has not been on any hormones and has not had a period.  - Recommended she take one month of birth control pills to both rebuild endometrium after procedure and see if she has progestin withdrawal bleed.   - if she has withdrawal bleed, will stop OCPs and monitor bleeding pattern after that. If no withdrawal bleed, she will need further work-up for secondary amenorrhea (though doesn't yet meet criteria by time, but this pattern is abnormal for her as she previously had regular periods.)    Rosa Hill MD  OBGYN   [facility]  [facility] " [facility]       Again, thank you for allowing me to participate in the care of your patient.      Sincerely,    Rosa Hill MD

## 2025-06-08 ENCOUNTER — HEALTH MAINTENANCE LETTER (OUTPATIENT)
Age: 35
End: 2025-06-08

## 2025-06-16 NOTE — PROGRESS NOTES
Post-Op Assessment Note    CV Status:  Stable  Pain Score: 0    Pain management: adequate       Mental Status:  Awake and alert   Hydration Status:  Stable   PONV Controlled:  Controlled   Airway Patency:  Patent  Two or more mitigation strategies used for obstructive sleep apnea   Post Op Vitals Reviewed: Yes    No anethesia notable event occurred.    Staff: CRNA           Last Filed PACU Vitals:  Vitals Value Taken Time   Temp     Pulse 82    /62    Resp 14    SpO2 98                SUBJECTIVE   Umu Raghu is a 34 year old , Patient's last menstrual period was 10/23/2024 (exact date)., referred for consultation for abnormal uterine bleeding in the setting of endometrial polyp.  Patient was seen by midwife service for abnormal uterine bleeding and underwent ultrasound imaging.  Ultrasound showed myomatous uterus with 1 x 1 x 1 cm endometrial polyp.    Patient describes abnormal bleeding as heavy with 4-5 pads a day and small clots.  Patient is concerned about amount of bleeding.  Denies shortness of breath, denies chest pain, denies dizziness or syncope.    Specific concerns today:    AUB in the setting of endometrial polyp      Gynecologic History  Patient's last menstrual period was 10/23/2024 (exact date).   Menstrual History:      10/9/2024     3:00 PM 10/24/2024     9:40 AM 10/30/2024     1:15 PM   Menstrual History   LAST MENSTRUAL PERIOD 2024 10/21/2024 10/23/2024       Current contraception: none  Number of partners in last year: 1    Obstetric History  OB History    Para Term  AB Living   1 1 1 0 0 10   SAB IAB Ectopic Multiple Live Births   0 0 0 0 1      # Outcome Date GA Lbr Dev/2nd Weight Sex Type Anes PTL Lv   1 Term 08/15/23 37w0d   M  EPI  LIVE BIRTH        Past Medical History  Past Medical History:   Diagnosis Date    Fibroid uterus        Past Surgical History  No past surgical history on file.    Medications  Current Outpatient Medications   Medication Sig Dispense Refill    norgestimate-ethinyl estradiol (ORTHO-CYCLEN) 0.25-35 MG-MCG tablet Take 1 tablet by mouth daily. 90 tablet 3     No current facility-administered medications for this visit.       Allergies   No Known Allergies    Social History  Social History     Tobacco Use    Smoking status: Never    Smokeless tobacco: Never   Vaping Use    Vaping status: Never Used   Substance Use Topics    Alcohol use: Never    Drug use: Never       Family History  Family History   Problem  Relation Age of Onset    Stomach Cancer Mother     Liver Cancer Maternal Grandmother        OBJECTIVE   /78   Pulse 76   Wt 72.1 kg (159 lb)   LMP 10/23/2024 (Exact Date)   BMI 27.29 kg/m    BMI: Body mass index is 27.29 kg/m .  General:  Alert, no distress   Head:  Normocephalic, without obvious abnormality   Lungs:  Clear to auscultation bilaterally   Heart:  Regular rate and rhythm, no murmur   Abdomen:  Soft, non-tender, non-distended, bowel sounds normal   Extremities:  normal       ?  ASSESSMENT   Umu Krishnamurthy is a 34 year old referred for consultation for abnormal uterine bleeding in the setting of endometrial polyp.  Discussed recommendation for hysteroscopy, dilation curettage with morcellation.  We discussed risks and benefits of minimally invasive procedure to remove polyp and any other abnormal tissue.  We discussed very low suspicion for precancerous or cancerous etiology however cannot rule out without sending for pathology.  Anticipate bleeding will improve with removal of endometrial polyp.  Patient additionally hoping for future pregnancy, we discussed the additional recommendation for removal of polyp in the setting of intending conception.  Patient agreeable to plan and will schedule surgical management.     PLAN     #AUB  #Endometrial polyp  -Plan for hysteroscopy, dilation curettage, morcellation of endometrial polyp  -Preoperative orders placed  -Sprintec prescription sent, recommended daily use until procedure for bleeding control  -Given strict return precautions for shortness of breath, lightheadedness, bleeding greater than 1 pad an hour  -This visit will serve as preoperative history and physical, patient healthy and tolerated anesthesia in the past    Sheldon Jean-Baptiste DO, MA  [facility]- PGY3  1:59 PM October 30, 2024     The Patient was seen in [facility] by SHELDON JEAN-BAPTISTE.  I reviewed the history & exam. Assessment and plan were jointly made.    Paulina Anderson  MD Alex

## 2025-08-18 ENCOUNTER — OFFICE VISIT (OUTPATIENT)
Dept: INTERNAL MEDICINE | Facility: CLINIC | Age: 35
End: 2025-08-18
Payer: COMMERCIAL

## 2025-08-18 VITALS
HEIGHT: 64 IN | TEMPERATURE: 97 F | RESPIRATION RATE: 19 BRPM | BODY MASS INDEX: 27.49 KG/M2 | OXYGEN SATURATION: 100 % | WEIGHT: 161 LBS | HEART RATE: 83 BPM

## 2025-08-18 DIAGNOSIS — D64.9 ANEMIA, UNSPECIFIED TYPE: ICD-10-CM

## 2025-08-18 DIAGNOSIS — Z11.4 SCREENING FOR HIV (HUMAN IMMUNODEFICIENCY VIRUS): ICD-10-CM

## 2025-08-18 DIAGNOSIS — Z00.00 ROUTINE GENERAL MEDICAL EXAMINATION AT A HEALTH CARE FACILITY: Primary | ICD-10-CM

## 2025-08-18 DIAGNOSIS — Z11.59 NEED FOR HEPATITIS C SCREENING TEST: ICD-10-CM

## 2025-08-18 LAB
ALBUMIN SERPL BCG-MCNC: 4.5 G/DL (ref 3.5–5.2)
ALP SERPL-CCNC: 68 U/L (ref 40–150)
ALT SERPL W P-5'-P-CCNC: 15 U/L (ref 0–50)
ANION GAP SERPL CALCULATED.3IONS-SCNC: 12 MMOL/L (ref 7–15)
AST SERPL W P-5'-P-CCNC: 20 U/L (ref 0–45)
BASOPHILS # BLD AUTO: <0.04 10E3/UL (ref 0–0.2)
BASOPHILS NFR BLD AUTO: 0 %
BILIRUB SERPL-MCNC: 0.2 MG/DL
BUN SERPL-MCNC: 6.1 MG/DL (ref 6–20)
CALCIUM SERPL-MCNC: 9.8 MG/DL (ref 8.8–10.4)
CHLORIDE SERPL-SCNC: 106 MMOL/L (ref 98–107)
CHOLEST SERPL-MCNC: 162 MG/DL
CREAT SERPL-MCNC: 0.58 MG/DL (ref 0.51–0.95)
EGFRCR SERPLBLD CKD-EPI 2021: >90 ML/MIN/1.73M2
EOSINOPHIL # BLD AUTO: 0.1 10E3/UL (ref 0–0.7)
EOSINOPHIL NFR BLD AUTO: 1.7 %
ERYTHROCYTE [DISTWIDTH] IN BLOOD BY AUTOMATED COUNT: 15.2 % (ref 10–15)
EST. AVERAGE GLUCOSE BLD GHB EST-MCNC: 120 MG/DL
FASTING STATUS PATIENT QL REPORTED: NO
FASTING STATUS PATIENT QL REPORTED: NO
GLUCOSE SERPL-MCNC: 112 MG/DL (ref 70–99)
HBA1C MFR BLD: 5.8 % (ref 0–5.6)
HCO3 SERPL-SCNC: 23 MMOL/L (ref 22–29)
HCT VFR BLD AUTO: 33.2 % (ref 35–47)
HCV AB SERPL QL IA: NONREACTIVE
HDLC SERPL-MCNC: 35 MG/DL
HGB BLD-MCNC: 10.4 G/DL (ref 11.7–15.7)
HIV 1+2 AB+HIV1 P24 AG SERPL QL IA: NONREACTIVE
IMM GRANULOCYTES # BLD: <0.04 10E3/UL
IMM GRANULOCYTES NFR BLD: 0.2 %
LDLC SERPL CALC-MCNC: 94 MG/DL
LYMPHOCYTES # BLD AUTO: 1.52 10E3/UL (ref 0.8–5.3)
LYMPHOCYTES NFR BLD AUTO: 25.6 %
MCH RBC QN AUTO: 22.6 PG (ref 26.5–33)
MCHC RBC AUTO-ENTMCNC: 31.3 G/DL (ref 31.5–36.5)
MCV RBC AUTO: 72 FL (ref 78–100)
MONOCYTES # BLD AUTO: 0.32 10E3/UL (ref 0–1.3)
MONOCYTES NFR BLD AUTO: 5.4 %
NEUTROPHILS # BLD AUTO: 3.99 10E3/UL (ref 1.6–8.3)
NEUTROPHILS NFR BLD AUTO: 67.1 %
NONHDLC SERPL-MCNC: 127 MG/DL
PLATELET # BLD AUTO: 213 10E3/UL (ref 150–450)
POTASSIUM SERPL-SCNC: 4 MMOL/L (ref 3.4–5.3)
PROT SERPL-MCNC: 7.6 G/DL (ref 6.4–8.3)
RBC # BLD AUTO: 4.61 10E6/UL (ref 3.8–5.2)
SODIUM SERPL-SCNC: 141 MMOL/L (ref 135–145)
TRIGL SERPL-MCNC: 165 MG/DL
TSH SERPL DL<=0.005 MIU/L-ACNC: 0.52 UIU/ML (ref 0.3–4.2)
WBC # BLD AUTO: 5.94 10E3/UL (ref 4–11)

## 2025-08-18 PROCEDURE — 82607 VITAMIN B-12: CPT | Performed by: STUDENT IN AN ORGANIZED HEALTH CARE EDUCATION/TRAINING PROGRAM

## 2025-08-18 PROCEDURE — 91320 SARSCV2 VAC 30MCG TRS-SUC IM: CPT | Performed by: STUDENT IN AN ORGANIZED HEALTH CARE EDUCATION/TRAINING PROGRAM

## 2025-08-18 PROCEDURE — 86803 HEPATITIS C AB TEST: CPT | Performed by: STUDENT IN AN ORGANIZED HEALTH CARE EDUCATION/TRAINING PROGRAM

## 2025-08-18 PROCEDURE — 83540 ASSAY OF IRON: CPT | Performed by: STUDENT IN AN ORGANIZED HEALTH CARE EDUCATION/TRAINING PROGRAM

## 2025-08-18 PROCEDURE — 3044F HG A1C LEVEL LT 7.0%: CPT | Performed by: STUDENT IN AN ORGANIZED HEALTH CARE EDUCATION/TRAINING PROGRAM

## 2025-08-18 PROCEDURE — 85025 COMPLETE CBC W/AUTO DIFF WBC: CPT | Performed by: STUDENT IN AN ORGANIZED HEALTH CARE EDUCATION/TRAINING PROGRAM

## 2025-08-18 PROCEDURE — 80061 LIPID PANEL: CPT | Performed by: STUDENT IN AN ORGANIZED HEALTH CARE EDUCATION/TRAINING PROGRAM

## 2025-08-18 PROCEDURE — 83036 HEMOGLOBIN GLYCOSYLATED A1C: CPT | Performed by: STUDENT IN AN ORGANIZED HEALTH CARE EDUCATION/TRAINING PROGRAM

## 2025-08-18 PROCEDURE — 80053 COMPREHEN METABOLIC PANEL: CPT | Performed by: STUDENT IN AN ORGANIZED HEALTH CARE EDUCATION/TRAINING PROGRAM

## 2025-08-18 PROCEDURE — 82728 ASSAY OF FERRITIN: CPT | Performed by: STUDENT IN AN ORGANIZED HEALTH CARE EDUCATION/TRAINING PROGRAM

## 2025-08-18 PROCEDURE — 83550 IRON BINDING TEST: CPT | Performed by: STUDENT IN AN ORGANIZED HEALTH CARE EDUCATION/TRAINING PROGRAM

## 2025-08-18 PROCEDURE — 36415 COLL VENOUS BLD VENIPUNCTURE: CPT | Performed by: STUDENT IN AN ORGANIZED HEALTH CARE EDUCATION/TRAINING PROGRAM

## 2025-08-18 PROCEDURE — 99395 PREV VISIT EST AGE 18-39: CPT | Performed by: STUDENT IN AN ORGANIZED HEALTH CARE EDUCATION/TRAINING PROGRAM

## 2025-08-18 PROCEDURE — 90471 IMMUNIZATION ADMIN: CPT | Performed by: STUDENT IN AN ORGANIZED HEALTH CARE EDUCATION/TRAINING PROGRAM

## 2025-08-18 PROCEDURE — 84443 ASSAY THYROID STIM HORMONE: CPT | Performed by: STUDENT IN AN ORGANIZED HEALTH CARE EDUCATION/TRAINING PROGRAM

## 2025-08-18 PROCEDURE — 87389 HIV-1 AG W/HIV-1&-2 AB AG IA: CPT | Performed by: STUDENT IN AN ORGANIZED HEALTH CARE EDUCATION/TRAINING PROGRAM

## 2025-08-18 PROCEDURE — 90480 ADMN SARSCOV2 VAC 1/ONLY CMP: CPT | Performed by: STUDENT IN AN ORGANIZED HEALTH CARE EDUCATION/TRAINING PROGRAM

## 2025-08-18 PROCEDURE — 84466 ASSAY OF TRANSFERRIN: CPT | Performed by: STUDENT IN AN ORGANIZED HEALTH CARE EDUCATION/TRAINING PROGRAM

## 2025-08-18 PROCEDURE — 90715 TDAP VACCINE 7 YRS/> IM: CPT | Performed by: STUDENT IN AN ORGANIZED HEALTH CARE EDUCATION/TRAINING PROGRAM

## 2025-08-18 PROCEDURE — 1126F AMNT PAIN NOTED NONE PRSNT: CPT | Performed by: STUDENT IN AN ORGANIZED HEALTH CARE EDUCATION/TRAINING PROGRAM

## 2025-08-18 SDOH — HEALTH STABILITY: PHYSICAL HEALTH: ON AVERAGE, HOW MANY MINUTES DO YOU ENGAGE IN EXERCISE AT THIS LEVEL?: 10 MIN

## 2025-08-18 SDOH — HEALTH STABILITY: PHYSICAL HEALTH: ON AVERAGE, HOW MANY DAYS PER WEEK DO YOU ENGAGE IN MODERATE TO STRENUOUS EXERCISE (LIKE A BRISK WALK)?: 0 DAYS

## 2025-08-18 ASSESSMENT — PAIN SCALES - GENERAL: PAINLEVEL_OUTOF10: NO PAIN (0)

## 2025-08-18 ASSESSMENT — SOCIAL DETERMINANTS OF HEALTH (SDOH): HOW OFTEN DO YOU GET TOGETHER WITH FRIENDS OR RELATIVES?: ONCE A WEEK

## 2025-08-19 ENCOUNTER — TELEPHONE (OUTPATIENT)
Dept: INTERNAL MEDICINE | Facility: CLINIC | Age: 35
End: 2025-08-19
Payer: COMMERCIAL

## 2025-08-19 LAB
FERRITIN SERPL-MCNC: 8 NG/ML (ref 6–175)
IRON BINDING CAPACITY (ROCHE): 393 UG/DL (ref 240–430)
IRON SATN MFR SERPL: 5 % (ref 15–46)
IRON SERPL-MCNC: 21 UG/DL (ref 37–145)
TRANSFERRIN SERPL-MCNC: 349 MG/DL (ref 200–360)
VIT B12 SERPL-MCNC: 151 PG/ML (ref 232–1245)

## (undated) DEVICE — SEAL SET MYOSURE ROD LENS SCOPE SINGLE USE 40-902

## (undated) DEVICE — SOL NACL 0.9% IRRIG 3000ML BAG 2B7477

## (undated) DEVICE — LINEN TOWEL PACK X5 5464

## (undated) DEVICE — PREP DYNA-HEX 4% CHG SCRUB 4OZ BOTTLE MDS098710

## (undated) DEVICE — Device

## (undated) DEVICE — DEVICE TISSUE REMOVAL HYSTEROSCOPIC MYOSURE LITE 30-401LITE

## (undated) DEVICE — KIT PROCEDURE FLUENT IN/OUT FLOWPAK TISS TRAP FLT-112S

## (undated) DEVICE — GLOVE BIOGEL PI MICRO SZ 6.0 48560

## (undated) DEVICE — GLOVE BIOGEL PI MICRO INDICATOR UNDERGLOVE SZ 6.0 48960

## (undated) DEVICE — DECANTER TRANSFER DEVICE 2008S

## (undated) DEVICE — LINEN GOWN X4 5410

## (undated) DEVICE — PAD CHUX UNDERPAD 30X36" P3036C

## (undated) DEVICE — STRAP KNEE/BODY 31143004

## (undated) DEVICE — SOLIDIFIER (USE FOR UP TO 1500CC) MSOLID1500

## (undated) RX ORDER — FENTANYL CITRATE 50 UG/ML
INJECTION, SOLUTION INTRAMUSCULAR; INTRAVENOUS
Status: DISPENSED
Start: 2024-11-29

## (undated) RX ORDER — OXYCODONE HYDROCHLORIDE 5 MG/1
TABLET ORAL
Status: DISPENSED
Start: 2024-11-29

## (undated) RX ORDER — ACETAMINOPHEN 325 MG/1
TABLET ORAL
Status: DISPENSED
Start: 2024-11-29